# Patient Record
Sex: FEMALE | Race: WHITE | Employment: UNEMPLOYED | URBAN - METROPOLITAN AREA
[De-identification: names, ages, dates, MRNs, and addresses within clinical notes are randomized per-mention and may not be internally consistent; named-entity substitution may affect disease eponyms.]

---

## 2020-06-24 ENCOUNTER — HOSPITAL ENCOUNTER (EMERGENCY)
Age: 30
Discharge: HOME | End: 2020-06-24
Payer: OTHER MISCELLANEOUS

## 2020-06-24 VITALS
TEMPERATURE: 98 F | RESPIRATION RATE: 18 BRPM | DIASTOLIC BLOOD PRESSURE: 87 MMHG | HEART RATE: 80 BPM | SYSTOLIC BLOOD PRESSURE: 123 MMHG | BODY MASS INDEX: 20.8 KG/M2 | HEIGHT: 62 IN | WEIGHT: 113 LBS | OXYGEN SATURATION: 100 %

## 2020-06-24 PROCEDURE — 90471 IMMUNIZATION ADMIN: CPT | Performed by: PHYSICIAN ASSISTANT

## 2020-06-24 PROCEDURE — 6360000002 HC RX W HCPCS: Performed by: PHYSICIAN ASSISTANT

## 2020-06-24 PROCEDURE — 90715 TDAP VACCINE 7 YRS/> IM: CPT | Performed by: PHYSICIAN ASSISTANT

## 2020-06-24 PROCEDURE — 99283 EMERGENCY DEPT VISIT LOW MDM: CPT

## 2020-06-24 PROCEDURE — 6370000000 HC RX 637 (ALT 250 FOR IP): Performed by: PHYSICIAN ASSISTANT

## 2020-06-24 RX ORDER — IBUPROFEN 400 MG/1
200 TABLET ORAL ONCE
Status: COMPLETED | OUTPATIENT
Start: 2020-06-24 | End: 2020-06-24

## 2020-06-24 RX ADMIN — IBUPROFEN 200 MG: 400 TABLET ORAL at 14:24

## 2020-06-24 RX ADMIN — TETANUS TOXOID, REDUCED DIPHTHERIA TOXOID AND ACELLULAR PERTUSSIS VACCINE, ADSORBED 0.5 ML: 5; 2.5; 8; 8; 2.5 SUSPENSION INTRAMUSCULAR at 14:25

## 2020-06-24 ASSESSMENT — PAIN SCALES - GENERAL
PAINLEVEL_OUTOF10: 7
PAINLEVEL_OUTOF10: 7

## 2020-06-24 ASSESSMENT — PAIN DESCRIPTION - LOCATION: LOCATION: HEAD

## 2020-06-24 ASSESSMENT — PAIN DESCRIPTION - PAIN TYPE: TYPE: ACUTE PAIN
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
## 2020-10-06 ENCOUNTER — HOSPITAL ENCOUNTER (OUTPATIENT)
Age: 30
Setting detail: SPECIMEN
Discharge: HOME | End: 2020-10-06

## 2020-10-06 PROCEDURE — 87491 CHLMYD TRACH DNA AMP PROBE: CPT

## 2020-10-06 PROCEDURE — 88142 CYTOPATH C/V THIN LAYER: CPT

## 2020-10-06 PROCEDURE — 87070 CULTURE OTHR SPECIMN AEROBIC: CPT

## 2020-10-06 PROCEDURE — 87591 N.GONORRHOEAE DNA AMP PROB: CPT

## 2020-10-09 LAB
CULTURE: ABNORMAL
CULTURE: ABNORMAL
Lab: ABNORMAL
SPECIMEN: ABNORMAL

## 2020-11-06 LAB
CHLAMYDIA TRACHOMATIS AMPLIFIED DET: NEGATIVE
N GONORRHOEAE AMPLIFIED DET: NEGATIVE

## 2021-10-06 ENCOUNTER — HOSPITAL ENCOUNTER (OUTPATIENT)
Dept: GENERAL RADIOLOGY | Age: 31
Discharge: HOME | End: 2021-10-06

## 2021-10-06 DIAGNOSIS — N92.5 RETROGRADE MENSTRUATION: ICD-10-CM

## 2021-10-06 PROCEDURE — 74740 X-RAY FEMALE GENITAL TRACT: CPT

## 2021-10-06 PROCEDURE — 58340 CATHETER FOR HYSTEROGRAPHY: CPT

## 2021-10-06 PROCEDURE — 6360000004 HC RX CONTRAST MEDICATION: Performed by: OBSTETRICS & GYNECOLOGY

## 2021-10-06 RX ADMIN — IOHEXOL 30 ML: 240 INJECTION, SOLUTION INTRATHECAL; INTRAVASCULAR; INTRAVENOUS; ORAL at 14:07
